# Patient Record
Sex: FEMALE | Race: WHITE | NOT HISPANIC OR LATINO | Employment: FULL TIME | URBAN - METROPOLITAN AREA
[De-identification: names, ages, dates, MRNs, and addresses within clinical notes are randomized per-mention and may not be internally consistent; named-entity substitution may affect disease eponyms.]

---

## 2024-05-29 ENCOUNTER — HOSPITAL ENCOUNTER (OUTPATIENT)
Dept: GENERAL RADIOLOGY | Facility: HOSPITAL | Age: 49
Discharge: HOME | End: 2024-05-29
Payer: COMMERCIAL

## 2024-05-29 ENCOUNTER — OFFICE VISIT (OUTPATIENT)
Dept: NEUROSURGERY | Facility: CLINIC | Age: 49
End: 2024-05-29
Payer: COMMERCIAL

## 2024-05-29 VITALS — BODY MASS INDEX: 48.59 KG/M2 | HEIGHT: 59 IN | WEIGHT: 241 LBS | TEMPERATURE: 97.5 F

## 2024-05-29 DIAGNOSIS — M54.50 CHRONIC MIDLINE LOW BACK PAIN, UNSPECIFIED WHETHER SCIATICA PRESENT: Primary | ICD-10-CM

## 2024-05-29 DIAGNOSIS — G89.29 CHRONIC MIDLINE LOW BACK PAIN, UNSPECIFIED WHETHER SCIATICA PRESENT: Primary | ICD-10-CM

## 2024-05-29 DIAGNOSIS — M51.36 DEGENERATIVE DISC DISEASE, LUMBAR: ICD-10-CM

## 2024-05-29 DIAGNOSIS — M47.816 FACET ARTHROPATHY, LUMBAR: ICD-10-CM

## 2024-05-29 PROCEDURE — 99204 OFFICE O/P NEW MOD 45 MIN: CPT

## 2024-05-29 PROCEDURE — 72120 X-RAY BEND ONLY L-S SPINE: CPT

## 2024-05-29 RX ORDER — GABAPENTIN 300 MG/1
CAPSULE ORAL
COMMUNITY
Start: 2024-05-02

## 2024-05-29 RX ORDER — FLUCONAZOLE 200 MG/1
TABLET ORAL
COMMUNITY
Start: 2024-04-17

## 2024-05-29 RX ORDER — HYDROCODONE BITARTRATE AND ACETAMINOPHEN 10; 325 MG/1; MG/1
TABLET ORAL
COMMUNITY
Start: 2024-05-02

## 2024-05-29 RX ORDER — TIRZEPATIDE 15 MG/.5ML
INJECTION, SOLUTION SUBCUTANEOUS
COMMUNITY
Start: 2024-02-16

## 2024-05-29 RX ORDER — ERGOCALCIFEROL 1.25 MG/1
CAPSULE ORAL
COMMUNITY
Start: 2024-05-04

## 2024-05-29 NOTE — PROGRESS NOTES
Patient: Alyson Weaver  : 1975  Chart #: [id number]    Date of Service: 2024    Chief Complaint   Patient presents with    Back Pain    Leg Pain    Numbness    Tingling       Back Pain  Associated symptoms include leg pain. Pertinent negatives include no abdominal pain, chest pain, dysuria, fever, headaches, numbness or weakness.   Leg Pain   Pertinent negatives include no numbness.     HPI: Ms. 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  She presents for a several month history of pain throughout her neck and back, but is most predominant in her low back.  She has had back pain in years prior.  She is not report any inciting incident that brought on this latest recurrence.  She has bilateral numbness and tingling in her legs, back pain is worse than her leg pain.  Walking for long distances induces of sensation of increased pressure on her back.  She states that she has had 2 episodes where she got up from bed and lost control of her bladder, the last episode being about 1 month ago.  She states she has numbness and tingling in her groin for the past month as well.    The patient states that she cannot afford to pay for physical therapy sessions or take the time off work.  She has not tried chiropractic.  She takes 7.5 [place] daily for migraine headaches; her primary care provider increased this to Norco 10 last month secondary to her back pain. She takes this 1-2 times daily.  She has used gabapentin in the past and does not like the way it makes her feel.     Chronic Illnesses:    Past Medical History:   Diagnosis Date    Allergies     Anxiety     Arthritis     Arthritis     Cluster headache     Diabetes mellitus     Diverticulitis     Dizziness     Fibromyalgia     H/O bladder problems     Headache, tension-type     Heart murmur     Hypertension     Kidney infection     Migraine     Murmur     Numbness and tingling     PONV (postoperative nausea and vomiting)     Spinal  headache          Past Surgical History:   Procedure Laterality Date    APPENDECTOMY      BREAST SURGERY      ductal removal    COLONOSCOPY      FRACTURE SURGERY Left     GALLBLADDER SURGERY      HYSTERECTOMY      NEPHRECTOMY Right     ORIF HUMERUS FRACTURE Right 7/24/2019    Procedure: HUMERUS MID-SHAFT FRACTURE OPEN REDUCTION INTERNAL FIXATION;  Surgeon: Toby Roberts MD;  Location: [facility];  Service: Orthopedics    TUBAL ABDOMINAL LIGATION      WRIST SURGERY         Allergies   Allergen Reactions    Sulfa Antibiotics Itching    Trimethoprim Unknown (See Comments)         Current Outpatient Medications:     amLODIPine (NORVASC) 10 MG tablet, Take 1 tablet by mouth Daily., Disp: , Rfl:     armodafinil (NUVIGIL) 150 MG tablet, Take 1 tablet by mouth 2 (Two) Times a Day., Disp: 60 tablet, Rfl: 5    cetirizine (zyrTEC) 10 MG tablet, TAKE ONE (1) TABLET BY MOUTH ONCE DAILY FOR ALLERGIES, Disp: , Rfl:     Cetirizine HCl 10 MG capsule, Take 1 tablet by mouth., Disp: , Rfl:     Diclofenac Sodium (VOLTAREN) 1 % gel gel, APPLY 2GRAMS TOPICALLY FOUR TIMES DAILY TO AFFECTED AREAS, Disp: , Rfl:     DULoxetine (CYMBALTA) 60 MG capsule, Take 1 capsule by mouth Daily., Disp: , Rfl:     fenofibrate (TRICOR) 145 MG tablet, TAKE ONE (1) TABLET BY MOUTH ONCE DAILY FOR CHOLESTROL, Disp: , Rfl:     fluconazole (DIFLUCAN) 200 MG tablet, , Disp: , Rfl:     fluticasone (FLONASE) 50 MCG/ACT nasal spray, 1-2 sprays into the nostril(s) as directed by provider., Disp: , Rfl:     furosemide (LASIX) 20 MG tablet, take 1 tablet by oral route every day as needed, Disp: , Rfl:     gabapentin (NEURONTIN) 300 MG capsule, TAKE ONE (1) CAPSULE BY ORAL ROUTE EVERY NIGHT, Disp: , Rfl:     glimepiride (AMARYL) 4 MG tablet, Take 2 tablets by mouth Daily., Disp: , Rfl:     HYDROcodone-acetaminophen (NORCO)  MG per tablet, take 1 tablet by oral route every 8-12 hours as needed for pain, Disp: , Rfl:     HYDROcodone-acetaminophen  (NORCO) 7.5-325 MG per tablet, take 1 tablet by oral route every 6 hours as needed for pain, Disp: , Rfl:     ibuprofen (ADVIL,MOTRIN) 200 MG tablet, Take 2 tablets by mouth Every 6 (Six) Hours As Needed for Mild Pain., Disp: , Rfl:     insulin glargine (LANTUS, SEMGLEE) 100 UNIT/ML injection, Inject  under the skin into the appropriate area as directed Daily., Disp: , Rfl:     losartan-hydrochlorothiazide (HYZAAR) 50-12.5 MG per tablet, Take 2 tablets by mouth., Disp: , Rfl:     metFORMIN ER (GLUCOPHAGE-XR) 500 MG 24 hr tablet, take 1 tablet by oral route twice a day, Disp: , Rfl:     Mounjaro 15 MG/0.5ML solution pen-injector pen, Inject  under the skin into the appropriate area as directed Every 7 (Seven) Days., Disp: , Rfl:     ondansetron (ZOFRAN) 4 MG tablet, Take 1 tablet by mouth Every 8 (Eight) Hours., Disp: , Rfl:     potassium chloride (MICRO-K) 10 MEQ CR capsule, 1 capsule., Disp: , Rfl:     raNITIdine (ZANTAC) 300 MG tablet, Take 1 tablet by mouth., Disp: , Rfl:     rOPINIRole (REQUIP) 2 MG tablet, Take 1 tablet by mouth 3 (Three) Times a Day., Disp: , Rfl:     vitamin D (ERGOCALCIFEROL) 1.25 MG (70516 UT) capsule capsule, , Disp: , Rfl:     Social History     Socioeconomic History    Marital status:    Tobacco Use    Smoking status: Never    Smokeless tobacco: Never   Vaping Use    Vaping status: Never Used   Substance and Sexual Activity    Alcohol use: No    Drug use: No    Sexual activity: Defer     Partners: Male       Family History   Problem Relation Age of Onset    Diabetes Mother     Arthritis Mother     Hypertension Mother     Osteoporosis Mother     Heart disease Mother     Heart disease Father     Arthritis Father     Osteoporosis Father     Hypertension Father     Cancer Other     Diabetes Other     Heart disease Other     Hypertension Other     Arthritis Other     Osteoporosis Other     Gout Other     Breast cancer Neg Hx        Class 3 Severe Obesity (BMI >=40). Obesity-related  health conditions include the following: none, coronary heart disease, and back pain . Obesity is newly identified. We discussed portion control and increasing exercise.       Review of Systems   Constitutional:  Negative for activity change, appetite change, chills, diaphoresis, fatigue, fever and unexpected weight change.   HENT:  Negative for congestion, dental problem, drooling, ear discharge, ear pain, facial swelling, hearing loss, mouth sores, nosebleeds, postnasal drip, rhinorrhea, sinus pressure, sinus pain, sneezing, sore throat, tinnitus, trouble swallowing and voice change.    Eyes:  Negative for photophobia, pain, discharge, redness, itching and visual disturbance.   Respiratory:  Negative for apnea, cough, choking, chest tightness, shortness of breath, wheezing and stridor.    Cardiovascular:  Negative for chest pain, palpitations and leg swelling.   Gastrointestinal:  Negative for abdominal distention, abdominal pain, anal bleeding, blood in stool, constipation, diarrhea, nausea, rectal pain and vomiting.   Endocrine: Negative for cold intolerance, heat intolerance, polydipsia, polyphagia and polyuria.   Genitourinary:  Negative for decreased urine volume, difficulty urinating, dysuria, enuresis, flank pain, frequency, genital sores, hematuria and urgency.   Musculoskeletal:  Positive for back pain. Negative for arthralgias, gait problem, joint swelling, myalgias, neck pain and neck stiffness.   Skin:  Negative for color change, pallor, rash and wound.   Allergic/Immunologic: Negative for environmental allergies, food allergies and immunocompromised state.   Neurological:  Negative for dizziness, tremors, seizures, syncope, facial asymmetry, speech difficulty, weakness, light-headedness, numbness and headaches.   Hematological:  Negative for adenopathy. Does not bruise/bleed easily.   Psychiatric/Behavioral:  Negative for agitation, behavioral problems, confusion, decreased concentration, dysphoric  "mood, hallucinations, self-injury, sleep disturbance and suicidal ideas. The patient is not nervous/anxious and is not hyperactive.    All other systems reviewed and are negative.       Physical examination:  Temperature 97.5 °F (36.4 °C), temperature source Temporal, height 149.9 cm (59\"), weight 109 kg (241 lb), not currently breastfeeding.    Constitutional: The patient is well-developed.  She is morbidly obese.  She is in no acute distress.    HEENT: normocephalic, atraumatic, sclera clear    Extremities:positive pulses, no edema    Musculoskeletal:   FROM lumbar spine.  Tenderness to palpation over the midline lumbar spine, some associated tenderness in paravertebral muscles.  Straight leg raise is negative bilaterally  Baldev sign negatively.   Motor examination does not reveal weakness in the , upper or lower extremities.     Neurologic Exam  A/A/C, speech clear, attention normal, conversant, answers questions appropriately, good historian.  Sensation is intact to light touch testing; vibration testing is normal in the feet.  Gait is normal, balance is normal.   No tremors are noted.  Reflexes are intact.   Barger is negative. Clonus is negative.     Radiographic Imaging:  For my review MRI of the lumbar spine demonstrates degenerative disc disease at the L3-4, L4-5, and L5-S1 levels.  There is some associated facet arthropathy at these levels as well.  At the L4-5 level there is a diffuse disc bulge with a mild right foraminal protrusion that contacts the exiting L4 nerve root, but the foramen remains patent.  There is no canal stenosis.  Vertebral alignment is normal.    Medical Decision Making  Diagnoses and all orders for this visit:    1. Chronic midline low back pain, unspecified whether sciatica present (Primary)  -     Ambulatory Referral to Pain Management  -     XR Spine Lumbar Flex & Ext    2. Facet arthropathy, lumbar    3. Degenerative disc disease, lumbar    Ms. Weaver endorses back pain, " minimal radicular symptoms.   I had a leigh discussion with her that her back pain is secondary to degenerative change and is largely mechanical; there is not a role for neurosurgical intervention at this time. She states that she cannot participate in physical therapy due to the cost and does not have the hours to leave at work.  I have referred her for pain management, but had a leigh discussion with her that without trying physical therapy options may be limited there as well.  She is not interested in trying Lyrica as an alternative to gabapentin at this time.  For completeness, I have ordered flexion-extension x-rays of her lumbar spine to assess for instability.  I will phone her with these findings.  She will follow-up with our practice on an as-needed basis.    Any copied data from previous notes included in the (1) HPI, (2) PE, (3) MDM and/or assessment and plan has been reviewed and is accurate as of this day.    Taisha Heck PA-C     Patient Care Team:  Danielle Nieves APRN as PCP - General (Nurse Practitioner)  Danielle Nieves APRN as Referring Physician (Nurse Practitioner)  Bennett Castillo MD as Consulting Physician (Neurosurgery)